# Patient Record
(demographics unavailable — no encounter records)

---

## 2024-12-02 NOTE — DEVELOPMENTAL MILESTONES
[Normal Development] : Normal Development [None] : none [FreeTextEntry1] : Locomotor:  Runs well.  Up and down stairs, one step at a time.  Opens doors.  Climbs on furniture.  Jumps, both feet off floor, in place.Locomotor:  Runs well.  Up and down stairs, one step at a time.  Opens doors.  Climbs on furniture.  Jumps, both feet off floor, in place.Large Object:  [place] of 7 cubes,  "train" of 4 cubes.Small Object:  Threads shoelace through hole in safety pin.Crayon/Paper:  Imitates vertical stroke.  Imitates circular stroke with circular scribble.   Folds paper imitatively.  Parallel play.  Handles spoon well.  Helps to undress.  Listens to stories with pictures. [Passed] : passed

## 2024-12-02 NOTE — DISCUSSION/SUMMARY
[Normal Growth] : growth [Normal Development] : development [None] : No known medical problems [No Elimination Concerns] : elimination [No Feeding Concerns] : feeding [No Skin Concerns] : skin [Normal Sleep Pattern] : sleep [No Medications] : ~He/She~ is not on any medications [Parent/Guardian] : parent/guardian [] : The components of the vaccine(s) to be administered today are listed in the plan of care. The disease(s) for which the vaccine(s) are intended to prevent and the risks have been discussed with the caretaker.  The risks are also included in the appropriate vaccination information statements which have been provided to the patient's caregiver.  The caregiver has given consent to vaccinate. [FreeTextEntry1] : Continue cow's milk. Continue table foods, 3 meals with 2-3 snacks per day. Incorporate fluorinated water daily in a sippy cup. Brush teeth twice a day with soft toothbrush. Recommend visit to dentist. When in car, keep child in rear-facing car seats until age 2, or until  the maximum height and weight for seat is reached. Put toddler to sleep in own bed. Help toddler to maintain consistent daily routines and sleep schedule. Toilet training discussed. Ensure home is safe. Use consistent, positive discipline. Read aloud to toddler. Limit screen time to no more than 2 hours per day.  Getting over viral URI with mild wheeze Recommend supportive care including antipyretics, fluids, and nasal saline followed by nasal suction. Return if symptoms worsen or persist. albuterol prn Discussed signs/symptoms that would require immediate care.  Mother expressed understanding.

## 2024-12-02 NOTE — PHYSICAL EXAM

## 2024-12-02 NOTE — HISTORY OF PRESENT ILLNESS
[Mother] : mother [Cow's milk (Ounces per day ___)] : consumes [unfilled] oz of Cow's milk per day [Fruit] : fruit [Vegetables] : vegetables [Meat] : meat [Cereal] : cereal [Eggs] : eggs [Finger Foods] : finger foods [Table food] : table food [Normal] : Normal [Yes] : Patient goes to dentist yearly [Toothpaste] : Primary Fluoride Source: Toothpaste [In nursery school] : In nursery school [<2 hrs of screen time] : Less than 2 hrs of screen time [No] : Not at  exposure [Water heater temperature set at <120 degrees F] : Water heater temperature set at <120 degrees F [Car seat in back seat] : Car seat in back seat [Smoke Detectors] : Smoke detectors [Carbon Monoxide Detectors] : Carbon monoxide detectors [At risk for exposure to TB] : Not at risk for exposure to Tuberculosis [Up to date] : Up to date [LastFluorideTreatment] : 2024 [NO] : No

## 2025-03-24 NOTE — DISCUSSION/SUMMARY
[FreeTextEntry1] : Discussed etiology of molluscum: viral warts Gave information Discussed options of: observation, referral to derm, destruction, stimulation with Aldara Discussed auto-inocculation and contagiousness of molluscum Recheck prn  Recommend supportive care including antipyretics, fluids, and nasal saline followed by nasal suction. Return if symptoms worsen or persist.

## 2025-03-24 NOTE — HISTORY OF PRESENT ILLNESS
[de-identified] : RASH [FreeTextEntry6] : c/o rash in genital area for last few days no meds given no fever as per mom  slight congestion/cough. no fevers. eating/drinking well

## 2025-03-24 NOTE — BEGINNING OF VISIT
Fax number is [phone].  Pt still has a sore throat, more so when he wakes up in the morning. Runny nose and cough. Pt is asking when he can return to work.   [Mother] : mother

## 2025-03-24 NOTE — PHYSICAL EXAM
[NL] : moves all extremities x4, warm, well perfused x4 [de-identified] : few flesh colored papules inner thighs bilaterally

## 2025-07-21 NOTE — PHYSICAL EXAM
[NL] : moves all extremities x4, warm, well perfused x4 [de-identified] : abrasion L cheek, forehead
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
